# Patient Record
Sex: MALE | Race: WHITE | NOT HISPANIC OR LATINO
[De-identification: names, ages, dates, MRNs, and addresses within clinical notes are randomized per-mention and may not be internally consistent; named-entity substitution may affect disease eponyms.]

---

## 2021-07-01 ENCOUNTER — OFFICE VISIT (OUTPATIENT)
Age: 36
End: 2021-07-01

## 2022-06-25 ENCOUNTER — TELEPHONE ENCOUNTER (OUTPATIENT)
Age: 37
End: 2022-06-25

## 2022-06-26 ENCOUNTER — TELEPHONE ENCOUNTER (OUTPATIENT)
Age: 37
End: 2022-06-26

## 2022-06-26 RX ORDER — LEVALBUTEROL HYDROCHLORIDE 1.25 MG/3ML
XOPENEX( 1.25MG/3ML INHALATION   ) ACTIVE -HX ENTRY SOLUTION RESPIRATORY (INHALATION)
Status: ACTIVE | COMMUNITY
Start: 2022-06-27

## 2022-06-26 RX ORDER — SODIUM SULFATE, MAGNESIUM SULFATE, AND POTASSIUM CHLORIDE 17.75; 2.7; 2.25 G/1; G/1; G/1
TABLET ORAL ONCE
Qty: 1 | Refills: 0 | Status: ACTIVE | COMMUNITY
Start: 2022-06-27

## 2022-06-26 RX ORDER — ALBUTEROL SULFATE 90 UG/1
ALBUTEROL( 90MCG/ACT INHALATION   ) ACTIVE -HX ENTRY INHALANT RESPIRATORY (INHALATION)
Status: ACTIVE | COMMUNITY
Start: 2022-06-27

## 2022-06-27 ENCOUNTER — OFFICE VISIT (OUTPATIENT)
Dept: URBAN - METROPOLITAN AREA CLINIC 68 | Facility: CLINIC | Age: 37
End: 2022-06-27

## 2022-07-21 ENCOUNTER — OFFICE VISIT (OUTPATIENT)
Dept: URBAN - METROPOLITAN AREA SURGERY CENTER 12 | Facility: SURGERY CENTER | Age: 37
End: 2022-07-21

## 2022-08-05 ENCOUNTER — OFFICE VISIT (OUTPATIENT)
Dept: URBAN - METROPOLITAN AREA CLINIC 68 | Facility: CLINIC | Age: 37
End: 2022-08-05

## 2022-08-05 NOTE — HPI-MIGRATED HPI
Transition of Care : Patient evaluated due to rectal bleeding. had recent colonoscopy found with hemorroids.  Today referred that is feeling better, denied further bleeding  Denies nausea, vomits, dysphagia, odynophagia, heartburn, abdominal pain, diarrhea, constipation  or weight loss

## 2022-12-14 ENCOUNTER — OFFICE VISIT (OUTPATIENT)
Dept: URBAN - METROPOLITAN AREA CLINIC 68 | Facility: CLINIC | Age: 37
End: 2022-12-14

## 2022-12-14 NOTE — HPI-MIGRATED HPI
Transition of Care : Patient evaluated due to rectal bleeding. Referred having intermittent episodes of painless rectal bleeding. Had colonoscopy found with hemorroids. Referred bleeding is during bowel movements.  Denies nausea, vomits, dysphagia, odynophagia, heartburn, abdominal pain, diarrhea, constipation or weight loss

## 2022-12-14 NOTE — EXAM-MIGRATED EXAMINATIONS
General Examination: General appearance: -> alert, pleasant, well-nourished and in no acute distress   Head: -> normocephalic, atraumatic   Eyes: -> pupils equal, round, reactive to light and accommodation, sclera anicteric   Ears: -> normal   Oral cavity: -> mucosa moist   Neck / thyroid: ->    Rectal: ->    Extremities: -> normal extremity with no clubbing, cyanosis or edema   Neurologic: -> alert and oriented, normal exam with no motor or sensory deficits   Lymph nodes: ->    Skin: -> skin is warm and dry, with no rashes, good skin turgor and normal hair distribution, with no suspicious skin lesions   Heart: -> regular rate and rhythm without murmurs, gallops, clicks or rubs   Lungs: -> clear to auscultation bilaterally, with good air movement and no rales, rhonchi or wheezes   Breasts: ->    Abdomen: -> soft with good bowel sounds, nontender, and no masses or hepatosplenomegaly

## 2023-01-05 ENCOUNTER — OFFICE VISIT (OUTPATIENT)
Dept: URBAN - METROPOLITAN AREA CLINIC 68 | Facility: CLINIC | Age: 38
End: 2023-01-05

## 2023-01-05 NOTE — HPI-MIGRATED HPI
Transition of Care : Patient evaluated due to hemorroids. Here today for his 2nd hemorroidal banding treatment.  Denies nausea, vomits, dysphagia, odynophagia, heartburn, abdominal pain, diarrhea, constipation GI bleeding or weight loss

## 2023-01-23 ENCOUNTER — OFFICE VISIT (OUTPATIENT)
Dept: URBAN - METROPOLITAN AREA CLINIC 68 | Facility: CLINIC | Age: 38
End: 2023-01-23

## 2023-01-23 ENCOUNTER — DASHBOARD ENCOUNTERS (OUTPATIENT)
Age: 38
End: 2023-01-23

## 2023-01-23 NOTE — EXAM-MIGRATED EXAMINATIONS
General Examination: Extremities: -> normal extremity with no clubbing, cyanosis or edema   Neurologic: -> alert and oriented, normal exam with no motor or sensory deficits   Lymph nodes: ->    Skin: -> skin is warm and dry, with no rashes, good skin turgor and normal hair distribution, with no suspicious skin lesions   Heart: -> regular rate and rhythm without murmurs, gallops, clicks or rubs   Lungs: -> clear to auscultation bilaterally, with good air movement and no rales, rhonchi or wheezes   Breasts: ->    Abdomen: -> soft with good bowel sounds, nontender, and no masses or hepatosplenomegaly   General appearance: -> alert, pleasant, well-nourished and in no acute distress   Head: -> normocephalic, atraumatic   Eyes: -> pupils equal, round, reactive to light and accommodation, sclera anicteric   Ears: -> normal   Oral cavity: -> mucosa moist   Neck / thyroid: ->

## 2023-01-23 NOTE — HPI-MIGRATED HPI
Transition of Care : Patient evaluated due to hemorroids. here today for his 3rd hemorroidal banding treatment.  Referred resolve bleeding since started banding treatment.  Denies nausea, vomits, dysphagia, odynophagia, heartburn, abdominal pain, diarrhea, constipation GI bleeding or weight loss

## 2023-03-03 ENCOUNTER — OFFICE VISIT (OUTPATIENT)
Dept: URBAN - METROPOLITAN AREA CLINIC 68 | Facility: CLINIC | Age: 38
End: 2023-03-03